# Patient Record
Sex: MALE | Race: WHITE
[De-identification: names, ages, dates, MRNs, and addresses within clinical notes are randomized per-mention and may not be internally consistent; named-entity substitution may affect disease eponyms.]

---

## 2018-03-11 ENCOUNTER — HOSPITAL ENCOUNTER (EMERGENCY)
Dept: HOSPITAL 26 - MED | Age: 23
Discharge: LEFT BEFORE DISCHARGE | End: 2018-03-11
Payer: SELF-PAY

## 2018-03-11 DIAGNOSIS — Z53.21: Primary | ICD-10-CM

## 2018-03-11 NOTE — NUR
PATIENT LEFT WITHOUT BEING SEEN BY DR. TADEO. NO FURTHER CARE PROVIDED FOR 
PATIENT. PT CALLED AND PT STATED HE WAS LEAVING BECAUSE HE FELT BETTER AFTER 
VOMITTING, AND HE REFUSED TO STAY TO BE SEEN. ER MD MADE AWARE.